# Patient Record
Sex: MALE | URBAN - METROPOLITAN AREA
[De-identification: names, ages, dates, MRNs, and addresses within clinical notes are randomized per-mention and may not be internally consistent; named-entity substitution may affect disease eponyms.]

---

## 2022-12-23 ENCOUNTER — OFFICE VISIT (OUTPATIENT)
Dept: URBAN - METROPOLITAN AREA CLINIC 35 | Facility: CLINIC | Age: 22
End: 2022-12-23
Payer: COMMERCIAL

## 2022-12-23 ENCOUNTER — DASHBOARD ENCOUNTERS (OUTPATIENT)
Age: 22
End: 2022-12-23

## 2022-12-23 VITALS
WEIGHT: 163 LBS | SYSTOLIC BLOOD PRESSURE: 118 MMHG | HEART RATE: 64 BPM | OXYGEN SATURATION: 98 % | DIASTOLIC BLOOD PRESSURE: 70 MMHG | BODY MASS INDEX: 22.82 KG/M2 | HEIGHT: 71 IN

## 2022-12-23 DIAGNOSIS — R12 HEARTBURN: ICD-10-CM

## 2022-12-23 DIAGNOSIS — R14.0 ABDOMINAL BLOATING: ICD-10-CM

## 2022-12-23 DIAGNOSIS — K30 ACID INDIGESTION: ICD-10-CM

## 2022-12-23 PROBLEM — 162031009: Status: ACTIVE | Noted: 2022-12-23

## 2022-12-23 PROCEDURE — 99204 OFFICE O/P NEW MOD 45 MIN: CPT | Performed by: PHYSICIAN ASSISTANT

## 2022-12-23 RX ORDER — OMEPRAZOLE 20 MG/1
1 CAPSULE 30 MINUTES BEFORE MORNING MEAL CAPSULE, DELAYED RELEASE ORAL ONCE A DAY
Qty: 30 | Refills: 3 | OUTPATIENT
Start: 2022-12-23

## 2022-12-23 NOTE — PHYSICAL EXAM GASTROINTESTINAL
Abdomen, soft, nontender, nondistended, normal bowel sounds, Liver and Spleen, no hepatomegaly present no epitaxis/no sinus pressure

## 2022-12-23 NOTE — HPI-ACID REFLUX
22 year old male patient admits recent onset of heartburn/reflux over past 2 months. Symptoms are described as a burning sensation and is located in the center of his chest. He notes reflux episodes has started after the past couple of months, since he's changed his diet.  He also complains of indigestion.  He notes  a little bloating and gas. He admits having tried OTC medication, such as Tums, but has to take many of them, with some relief.  Denies dysphagia, globus, sour eructations, early satiety, changes in appetite, coughing, abdominal/epigastric pain, or changes in bowel habits. He denies completing an EGD in the past.  He does state he has had a 20 pound intentional weight gain in past 3 months and eats late at night.

## 2023-01-03 ENCOUNTER — TELEPHONE ENCOUNTER (OUTPATIENT)
Dept: URBAN - METROPOLITAN AREA CLINIC 35 | Facility: CLINIC | Age: 23
End: 2023-01-03